# Patient Record
Sex: MALE | Race: WHITE | NOT HISPANIC OR LATINO | URBAN - METROPOLITAN AREA
[De-identification: names, ages, dates, MRNs, and addresses within clinical notes are randomized per-mention and may not be internally consistent; named-entity substitution may affect disease eponyms.]

---
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
## 2025-05-28 ENCOUNTER — OFFICE VISIT (OUTPATIENT)
Dept: URGENT CARE | Facility: PHYSICIAN GROUP | Age: 20
End: 2025-05-28
Payer: COMMERCIAL

## 2025-05-28 ENCOUNTER — APPOINTMENT (OUTPATIENT)
Dept: URGENT CARE | Facility: PHYSICIAN GROUP | Age: 20
End: 2025-05-28

## 2025-05-28 VITALS
HEART RATE: 76 BPM | SYSTOLIC BLOOD PRESSURE: 114 MMHG | RESPIRATION RATE: 20 BRPM | BODY MASS INDEX: 28.26 KG/M2 | OXYGEN SATURATION: 96 % | HEIGHT: 71 IN | DIASTOLIC BLOOD PRESSURE: 72 MMHG | WEIGHT: 201.83 LBS | TEMPERATURE: 98.9 F

## 2025-05-28 DIAGNOSIS — J01.00 ACUTE MAXILLARY SINUSITIS, RECURRENCE NOT SPECIFIED: Primary | ICD-10-CM

## 2025-05-28 PROCEDURE — 3078F DIAST BP <80 MM HG: CPT

## 2025-05-28 PROCEDURE — 3074F SYST BP LT 130 MM HG: CPT

## 2025-05-28 PROCEDURE — 99213 OFFICE O/P EST LOW 20 MIN: CPT

## 2025-05-29 NOTE — PROGRESS NOTES
"Subjective     Andrae Hussein is a 19 y.o. male who presents with Congestion (X 1.5 weeks, c/o head congestion, cough increased at night, sore throat, nasal drainage, ear pressure on and off. )    Symptom onset 1.5 weeks. Nasal congestion, sinus pressure/pain. Thick purulent nasal discharge. Mild dry cough, no blood in sputum. No new onset fever or chills. No SOB. Denies hx of asthma or pna. Prior tx: cold medications otc. No other aggravating or alleviating factors.         Review of Systems   All other systems reviewed and are negative.      Medications:    This patient does not have an active medication from one of the medication groupers.    Allergies:  Patient has no known allergies.    Past Social Hx:  Andrae Hussein  reports that he has never smoked. He has never used smokeless tobacco. He reports current alcohol use. He reports that he does not use drugs.    Past Medical Hx: Past Medical History[1]              Objective     /72 (BP Location: Right arm, Patient Position: Sitting, BP Cuff Size: Adult long)   Pulse 76   Temp 37.2 °C (98.9 °F) (Temporal)   Resp 20   Ht 1.803 m (5' 11\")   Wt 91.6 kg (201 lb 13.3 oz)   SpO2 96%   BMI 28.15 kg/m²      Physical Exam  Vitals reviewed.   Constitutional:       General: He is not in acute distress.     Appearance: Normal appearance. He is not ill-appearing or toxic-appearing.   HENT:      Head: Normocephalic and atraumatic.      Right Ear: External ear normal.      Left Ear: External ear normal.      Nose:      Right Turbinates: Swollen.      Left Turbinates: Swollen.      Right Sinus: Maxillary sinus tenderness present.      Left Sinus: Maxillary sinus tenderness present.      Mouth/Throat:      Mouth: Mucous membranes are moist.   Eyes:      Conjunctiva/sclera: Conjunctivae normal.   Cardiovascular:      Rate and Rhythm: Normal rate.      Heart sounds: Normal heart sounds.   Pulmonary:      Effort: Pulmonary effort is normal.      Breath sounds: " Normal breath sounds. No wheezing, rhonchi or rales.   Musculoskeletal:      Cervical back: Normal range of motion and neck supple.   Skin:     General: Skin is warm and dry.      Capillary Refill: Capillary refill takes less than 2 seconds.   Neurological:      Mental Status: He is alert and oriented to person, place, and time.   Psychiatric:         Behavior: Behavior normal.                              Assessment & Plan  Acute maxillary sinusitis, recurrence not specified    Orders:    amoxicillin-clavulanate (AUGMENTIN) 875-125 MG Tab; Take 1 Tablet by mouth 2 times a day for 5 days.    VSS. No SOB or tachypnea. Lungs clear, no wheezing, rales, rhonchi, crackles.  Patient current presentation is most consistent with sinusitis.  Given reports of 1 and half weeks of persistent sinus pressure and pain as well as thick nasal discharge, will start oral antibiotics.  Recommend adjunctive therapy with over-the-counter Flonase, saline rinses, humidifier.  Drink lots of fluids.        Differential diagnosis, natural history, supportive care, and indications for immediate follow-up discussed. Follow-up as needed with PCP or RTC for recheck, reevaluation, and consideration of further management. Recommend Emergency Department or call 911 if symptoms fail to improve, for any change in condition, further concerns, or new concerning symptoms.     Discussed management options (risks, benefits, and alternatives to treatment). Pt/parent/guardian verbalized understanding and the treatment plan was agreed upon.     Electronically signed by   Adolfo Murphy DNP, HANS, CHRISTIE               [1]   Past Medical History:  Diagnosis Date    Croup